# Patient Record
Sex: FEMALE | Race: WHITE
[De-identification: names, ages, dates, MRNs, and addresses within clinical notes are randomized per-mention and may not be internally consistent; named-entity substitution may affect disease eponyms.]

---

## 2017-04-19 ENCOUNTER — HOSPITAL ENCOUNTER (EMERGENCY)
Dept: HOSPITAL 10 - E/R | Age: 22
LOS: 1 days | Discharge: HOME | End: 2017-04-20
Payer: COMMERCIAL

## 2017-04-19 ENCOUNTER — HOSPITAL ENCOUNTER (OUTPATIENT)
Dept: HOSPITAL 10 - OBT | Age: 22
Discharge: HOME | End: 2017-04-19
Attending: OBSTETRICS & GYNECOLOGY
Payer: COMMERCIAL

## 2017-04-19 VITALS — RESPIRATION RATE: 16 BRPM | DIASTOLIC BLOOD PRESSURE: 71 MMHG | HEART RATE: 83 BPM | SYSTOLIC BLOOD PRESSURE: 132 MMHG

## 2017-04-19 VITALS
WEIGHT: 234.57 LBS | BODY MASS INDEX: 37.7 KG/M2 | HEIGHT: 66 IN | HEIGHT: 66 IN | BODY MASS INDEX: 37.7 KG/M2 | WEIGHT: 234.57 LBS

## 2017-04-19 VITALS
HEIGHT: 64 IN | BODY MASS INDEX: 39.9 KG/M2 | WEIGHT: 233.69 LBS | BODY MASS INDEX: 39.9 KG/M2 | HEIGHT: 64 IN | WEIGHT: 233.69 LBS

## 2017-04-19 DIAGNOSIS — Z3A.32: ICD-10-CM

## 2017-04-19 DIAGNOSIS — J02.9: ICD-10-CM

## 2017-04-19 DIAGNOSIS — H66.011: ICD-10-CM

## 2017-04-19 DIAGNOSIS — O99.89: Primary | ICD-10-CM

## 2017-04-19 DIAGNOSIS — H92.09: ICD-10-CM

## 2017-04-19 DIAGNOSIS — Z3A.33: ICD-10-CM

## 2017-04-19 DIAGNOSIS — O26.893: Primary | ICD-10-CM

## 2017-04-19 PROCEDURE — G0463 HOSPITAL OUTPT CLINIC VISIT: HCPCS

## 2017-04-19 PROCEDURE — 59025 FETAL NON-STRESS TEST: CPT

## 2017-04-19 PROCEDURE — 99283 EMERGENCY DEPT VISIT LOW MDM: CPT

## 2017-04-19 NOTE — ERD
ER Documentation


Chief Complaint


Date/Time


DATE: 17 


TIME: 21:58


Chief Complaint


33 wks pregnant, cleared from L& D, right ear pain x 1 day





HPI


This 21-year-old female presents to emergency department today with right-sided 

otalgia that started this afternoon.  Patient denies any trauma.  Reports sore 

throat and nasal congestion 4 weeks.  Patient states pain is a pressure and 

clogged feeling, pulsating without any drainage coming from the ear.  Patient 

reports change in hearing. 


 00.  Patient is 33 weeks pregnant without complaint of pelvic pain, 

vaginal discharge, dysuria, normal baby movement.





ROS


All systems reviewed and are negative except as per history of present illness.





Medications


Home Meds


Active Scripts


Acetaminophen* (Tylenol*) 325 Mg Tablet, 2 TAB PO Q6 Y for PAIN AND OR ELEVATED 

TEMP, #20 TAB


   Prov:LARISA,CARLOS         17


Amoxicillin/Potassium Clav (Amox-Clav 875-125 mg Tablet) 875-125 mg Tab, 1 TAB 

PO BID for 10 Days, #20 TAB


   Prov:LARISA,CARLOS         17


Reported Medications


[Prenatal] 1 TAB TABLET No Conflict Check, 1 TAB PO DAILY


   13


Discontinued Scripts


Acetaminophen* (Tylophen*) 500 Mg Capsule, 1 CAP PO Q6H Y for PAIN AND OR 

ELEVATED TEMP, #20 CAP


   Prov:LARISA,CARLOS         17


Acetaminophen-Codeine* (Acetaminophen-Cod #3*) 300-30 Mg Tab, 1 TAB PO Q6 Y for 

PAIN, #10 TAB


   Prov:AMY YADAV         12/23/15





Allergies


Allergies:  


Coded Allergies:  


     No Known Allergies (Verified  Allergy, 13)


 PER WRITTEN ORDER





PMhx/Soc


History of Surgery:  No


Anesthesia Reaction:  No


Hx Neurological Disorder:  No


Hx Respiratory Disorders:  No


Hx Cardiac Disorders:  No


Hx Psychiatric Problems:  No


Hx Miscellaneous Medical Probl:  No


Hx Alcohol Use:  Yes (2x month)


Hx Substance Use:  No


Hx Tobacco Use:  No





Physical Exam


Vitals


Vitals stable, triage notes reviewed


Physical Exam


Const:     No acute distress


Head:   Atraumatic 


Eyes:    Normal Conjunctiva


ENT:    Right tympanic membrane is ruptured, no discharge noted, erythemic AI 

noted.  Left tympanic membrane translucent with normal light reflex.  Nasal 

mucosa edematous, turbinates touching.  Clear mucus noted.  Pharynx is pink, 

uvula rises and falls with pronation


Neck:               Full range of motion..~ No meningismus.


Resp:    Clear to auscultation bilaterally


Cardio:    Regular rate and rhythm, no murmurs


Abd:    's abdomen


Skin:    No petechiae or rashes


Back:    No midline or flank tenderness


Ext:    No cyanosis, or edema


Neur:    Awake and alert


Psych:    Normal Mood and Affect





Procedures/MDM


This 21-year-old 33 weeks pregnant presents to emergency department for right-

sided R otalgia, sudden onset this afternoon without trauma or discharge.  

Patient reports change in hearing, a clogged pressure pulsating feeling, nasal 

congestion and sore throat 4 days.  Differential diagnosis includes but not 

limited to otitis media, cerumen impaction, upper respiratory infection, 

sinusitis, mastoiditis.  Physical exam supports otitis media with spontaneous 

ruptured membrane no drainage noted.  Patient was started on Augmentin 875 mg 

twice daily, Tylenol for pain.  I feel patient can be monitored and managed by 

primary care physician.  Return to emergency room for worsening of pain, 

drainage, or fever.  I feel the patient is stable for discharge at this time.  

I have discussed results, examination findings, the treatment plan with the 

patient and family present prior to discharge.  Indications for emergent 

reevaluation, side effects of medication were also discussed.  All questions 

were answered.  Patient verbalizes understanding and agrees with plan of care.





Departure


Diagnosis:  


 Primary Impression:  


 Otitis media


 Otitis media type:  suppurative  Laterality:  right  Chronicity:  acute  

Recurrence:  not specified as recurrent  Spontaneous tympanic membrane rupture:

  with spontaneous rupture  Qualified Code:  H66.011 - Acute suppurative otitis 

media of right ear with spontaneous rupture of tympanic membrane, recurrence 

not specified


Condition:  Good


Patient Instructions:  Otitis Media, Abx Tx (Adult)


Referrals:  


NATHAN LEHMAN MD,REBEKA LOONEY,TRESSA ZAMBRANO,APOLONIA REYNOLDS MD, M.D., MICHAEL D MD PLEET,GABRIEL BENNETT





Additional Instructions:  


Thank you for for coming to [facility] for your care today. 

Please ask your nurse or provider if you have questions about your care today 

and do not leave until all your questions have been answered.  Please use any 

medications given as directed and follow-up with your doctor (or the doctor you 

were referred to) in the next 2-3 days. If you do not have a primary care 

doctor you may follow up at the [facility] (listed below). You may also 

use motrin and tylenol as needed for fever and/or pain unless instructed 

otherwise by your provider or nurse. Indications for more urgent follow-up have 

been discussed, but you may return to the Emergency Department at ANY time for 

any worrisome or worsening symptoms.





If you have abdominal pain, please know that no test or exam you received is 

perfect and you should follow up within 8 hours for continued pain.





If you had any imaging studies today, such as an X-Ray or CT Scan, these 

studies will be reviewed later by a radiologist. You will be called if there 

are important findings that were not identified today, so make sure the contact 

information you provided at registration is correct.





If you received any narcotic pain control medicine today, such as Vicodin, 

Morphine or Dilaudid, your coordination and judgment may be affected for a 

number of hours. Please do not drive or operate heavy machinery, and you may 

want someone to assist you at home. If you were given a prescription for 

narcotic medication, be aware that it is very addictive- use sparingly and only 

if necessary.











CARLOS PERES 2017 22:04

## 2017-04-19 NOTE — QN
Documentation


Comment


21 year-old  with IUP at 32 weeks and 6 days Sona presented with 

complaint of sore throat and right ear clogged today.  She denies any fever or 

chills or body aches.  She feels pressure in her ear.  Denies any abnormal 

discharge from her ears.  She denies any leaking of fluid, vaginal bleeding or 

decreased fetal movement.


Patient denies any complications during her prenatal care





Physical examination:


GA: A&O, NAD


Abdomen: Soft, gravid, nontender, fundal height consistent with gestational age


Extremities: No calf tenderness, no click no edema no cords palpable negative 

Homans sign


HEENT: No abnormal discharge from ears noted.  No tenderness in movement of the 

ear or tugging of the ear noted.  


NST: Category 1


No contraction on the monitor








Assessment:


IUP at 32 weeks and 6 days


No OB complaints


 testing: Reassuring


Earache and Sore throat.





Patient will be sent to ED for ENT evalatuation


at this time she is clear form OB stand point of view.





Strict  labor precaution and fetal kick count discussed with the patient





Follow-up with OB office after discharge from ED as a scheduled or sooner as 

needed any complaints discussed











ROSA TRIANA MD 2017 21:45

## 2017-06-07 ENCOUNTER — HOSPITAL ENCOUNTER (INPATIENT)
Dept: HOSPITAL 10 - OBT | Age: 22
LOS: 3 days | Discharge: HOME | End: 2017-06-10
Attending: OBSTETRICS & GYNECOLOGY | Admitting: OBSTETRICS & GYNECOLOGY
Payer: COMMERCIAL

## 2017-06-07 VITALS
BODY MASS INDEX: 38.62 KG/M2 | HEIGHT: 66 IN | BODY MASS INDEX: 38.62 KG/M2 | HEIGHT: 66 IN | WEIGHT: 240.3 LBS | WEIGHT: 240.3 LBS

## 2017-06-07 VITALS — HEART RATE: 101 BPM | DIASTOLIC BLOOD PRESSURE: 66 MMHG | SYSTOLIC BLOOD PRESSURE: 120 MMHG | RESPIRATION RATE: 18 BRPM

## 2017-06-07 DIAGNOSIS — O48.0: Primary | ICD-10-CM

## 2017-06-07 DIAGNOSIS — Z3A.40: ICD-10-CM

## 2017-06-07 PROCEDURE — 87340 HEPATITIS B SURFACE AG IA: CPT

## 2017-06-07 PROCEDURE — 85610 PROTHROMBIN TIME: CPT

## 2017-06-07 PROCEDURE — 86900 BLOOD TYPING SEROLOGIC ABO: CPT

## 2017-06-07 PROCEDURE — 90715 TDAP VACCINE 7 YRS/> IM: CPT

## 2017-06-07 PROCEDURE — 86592 SYPHILIS TEST NON-TREP QUAL: CPT

## 2017-06-07 PROCEDURE — 62319: CPT

## 2017-06-07 PROCEDURE — 85730 THROMBOPLASTIN TIME PARTIAL: CPT

## 2017-06-07 PROCEDURE — 85018 HEMOGLOBIN: CPT

## 2017-06-07 PROCEDURE — 86901 BLOOD TYPING SEROLOGIC RH(D): CPT

## 2017-06-07 PROCEDURE — 84560 ASSAY OF URINE/URIC ACID: CPT

## 2017-06-07 PROCEDURE — 81003 URINALYSIS AUTO W/O SCOPE: CPT

## 2017-06-07 PROCEDURE — 90716 VAR VACCINE LIVE SUBQ: CPT

## 2017-06-07 PROCEDURE — 85025 COMPLETE CBC W/AUTO DIFF WBC: CPT

## 2017-06-07 PROCEDURE — 80053 COMPREHEN METABOLIC PANEL: CPT

## 2017-06-07 PROCEDURE — 85014 HEMATOCRIT: CPT

## 2017-06-08 VITALS — DIASTOLIC BLOOD PRESSURE: 65 MMHG | RESPIRATION RATE: 16 BRPM | HEART RATE: 77 BPM | SYSTOLIC BLOOD PRESSURE: 115 MMHG

## 2017-06-08 VITALS — SYSTOLIC BLOOD PRESSURE: 126 MMHG | DIASTOLIC BLOOD PRESSURE: 56 MMHG | RESPIRATION RATE: 20 BRPM | HEART RATE: 79 BPM

## 2017-06-08 VITALS — RESPIRATION RATE: 44 BRPM | DIASTOLIC BLOOD PRESSURE: 68 MMHG | SYSTOLIC BLOOD PRESSURE: 127 MMHG | HEART RATE: 76 BPM

## 2017-06-08 VITALS — DIASTOLIC BLOOD PRESSURE: 65 MMHG | HEART RATE: 79 BPM | RESPIRATION RATE: 18 BRPM | SYSTOLIC BLOOD PRESSURE: 126 MMHG

## 2017-06-08 LAB
ADD SCAN DIFF: NO
ADD UMIC: NO
ALBUMIN SERPL-MCNC: 4.3 G/DL (ref 3.3–4.9)
ALBUMIN/GLOB SERPL: 1.34 {RATIO}
ALP SERPL-CCNC: 174 IU/L (ref 42–121)
ALT SERPL-CCNC: 23 IU/L (ref 13–69)
ANION GAP SERPL CALC-SCNC: 13 MMOL/L (ref 8–16)
APTT BLD: 25.3 SEC (ref 25–35)
AST SERPL-CCNC: 23 IU/L (ref 15–46)
BASOPHILS # BLD AUTO: 0 10^3/UL (ref 0–0.1)
BASOPHILS NFR BLD: 0.3 % (ref 0–2)
BILIRUB DIRECT SERPL-MCNC: 0 MG/DL (ref 0–0.2)
BILIRUB SERPL-MCNC: 0.1 MG/DL (ref 0.2–1.3)
BUN SERPL-MCNC: 12 MG/DL (ref 7–20)
CALCIUM SERPL-MCNC: 9.6 MG/DL (ref 8.4–10.2)
CHLORIDE SERPL-SCNC: 109 MMOL/L (ref 97–110)
CO2 SERPL-SCNC: 21 MMOL/L (ref 21–31)
COLOR UR: (no result)
CREAT SERPL-MCNC: 0.65 MG/DL (ref 0.44–1)
EOSINOPHIL # BLD: 0.1 10^3/UL (ref 0–0.5)
EOSINOPHIL NFR BLD: 0.5 % (ref 0–7)
ERYTHROCYTE [DISTWIDTH] IN BLOOD BY AUTOMATED COUNT: 14.2 % (ref 11.5–14.5)
GLOBULIN SER-MCNC: 3.2 G/DL (ref 1.3–3.2)
GLUCOSE SERPL-MCNC: 80 MG/DL (ref 70–220)
GLUCOSE UR STRIP-MCNC: NEGATIVE %
HCT VFR BLD CALC: 36.4 % (ref 37–47)
HCT VFR BLD CALC: 38.7 % (ref 37–47)
HGB BLD-MCNC: 11.9 G/DL (ref 12–16)
HGB BLD-MCNC: 13.1 G/DL (ref 12–16)
INR PPP: 0.93
KETONES UR STRIP.AUTO-MCNC: NEGATIVE MG/DL
LYMPHOCYTES # BLD AUTO: 3 10^3/UL (ref 0.8–2.9)
LYMPHOCYTES NFR BLD AUTO: 29.7 % (ref 15–51)
MCH RBC QN AUTO: 30.6 PG (ref 29–33)
MCHC RBC AUTO-ENTMCNC: 33.9 G/DL (ref 32–37)
MCV RBC AUTO: 90.4 FL (ref 82–101)
MONOCYTES # BLD: 0.7 10^3/UL (ref 0.3–0.9)
MONOCYTES NFR BLD: 7 % (ref 0–11)
NEUTROPHILS # BLD: 6.3 10^3/UL (ref 1.6–7.5)
NEUTROPHILS NFR BLD AUTO: 62.2 % (ref 39–77)
NITRITE UR QL STRIP.AUTO: NEGATIVE
NRBC # BLD MANUAL: 0 10^3/UL (ref 0–0)
NRBC BLD QL: 0 /100WBC (ref 0–0)
PLATELET # BLD: 183 10^3/UL (ref 140–415)
PMV BLD AUTO: 9.7 FL (ref 7.4–10.4)
POTASSIUM SERPL-SCNC: 4 MMOL/L (ref 3.5–5.1)
PROT SERPL-MCNC: 7.5 G/DL (ref 6.1–8.1)
PROTHROMBIN TIME: 12.5 SEC (ref 12.2–14.2)
PT RATIO: 1
RBC # BLD AUTO: 4.28 10^6/UL (ref 4.2–5.4)
RBC # UR AUTO: NEGATIVE /UL
SODIUM SERPL-SCNC: 139 MMOL/L (ref 135–144)
URATE SERPL-MCNC: 4.9 MG/DL (ref 3.1–7.9)
URINE BILIRUBIN (DIP): NEGATIVE
URINE TOTAL PROTEIN (DIP): NEGATIVE
UROBILINOGEN UR STRIP-ACNC: (no result) (ref 0.1–1)
WBC # BLD AUTO: 10 10^3/UL (ref 4.8–10.8)
WBC # UR STRIP: NEGATIVE /UL

## 2017-06-08 RX ADMIN — IBUPROFEN SCH MG: 600 TABLET ORAL at 18:31

## 2017-06-08 RX ADMIN — DOCUSATE SODIUM AND SENNOSIDES SCH TAB: 8.6; 5 TABLET, FILM COATED ORAL at 22:14

## 2017-06-08 RX ADMIN — PYRIDOXINE HYDROCHLORIDE SCH MLS/HR: 100 INJECTION, SOLUTION INTRAMUSCULAR; INTRAVENOUS at 23:37

## 2017-06-08 RX ADMIN — DOCUSATE SODIUM AND SENNOSIDES SCH TAB: 8.6; 5 TABLET, FILM COATED ORAL at 10:32

## 2017-06-08 RX ADMIN — IBUPROFEN SCH MG: 600 TABLET ORAL at 12:29

## 2017-06-08 RX ADMIN — PYRIDOXINE HYDROCHLORIDE SCH MLS/HR: 100 INJECTION, SOLUTION INTRAMUSCULAR; INTRAVENOUS at 11:57

## 2017-06-08 RX ADMIN — IBUPROFEN SCH MG: 600 TABLET ORAL at 06:58

## 2017-06-08 RX ADMIN — PYRIDOXINE HYDROCHLORIDE SCH MLS/HR: 100 INJECTION, SOLUTION INTRAMUSCULAR; INTRAVENOUS at 15:37

## 2017-06-08 RX ADMIN — PYRIDOXINE HYDROCHLORIDE SCH MLS/HR: 100 INJECTION, SOLUTION INTRAMUSCULAR; INTRAVENOUS at 07:37

## 2017-06-08 RX ADMIN — PYRIDOXINE HYDROCHLORIDE SCH MLS/HR: 100 INJECTION, SOLUTION INTRAMUSCULAR; INTRAVENOUS at 00:07

## 2017-06-08 RX ADMIN — IBUPROFEN SCH MG: 600 TABLET ORAL at 23:51

## 2017-06-08 RX ADMIN — PYRIDOXINE HYDROCHLORIDE SCH MLS/HR: 100 INJECTION, SOLUTION INTRAMUSCULAR; INTRAVENOUS at 19:57

## 2017-06-08 RX ADMIN — PYRIDOXINE HYDROCHLORIDE SCH MLS/HR: 100 INJECTION, SOLUTION INTRAMUSCULAR; INTRAVENOUS at 07:56

## 2017-06-08 NOTE — HP
Date/Time of Note


Date/Time of Note


late entry 


DATE: 17 


TIME: 18:15





OB - History


Hx of Present Pregnancy


Free Text/Dictation


admitted for labor augmentation


Last Menstrual Period:  Sep 1, 2016


Estimated Due Date:  2017


:  3


Para:  2


Obstetrical Complications:  None


Medical Complications:  None





Past Family/Social History


*


Past Medical, Surgical, Family and Obstetric Histories reviewed from prenatal 

chart.


Blood Type:  A+


Rubella:  immune


RPR/VDRL:  Negative


GBS Status:  Negative


HBsAG:  Negative





OB  Admission Exam


Vital Signs


Vital Signs





 Vital Signs








  Date Time  Temp Pulse Resp B/P Pulse Ox O2 Delivery O2 Flow Rate FiO2


 


17 16:35 98.3 76 44 127/68  Room Air  











Physical Exam


HEENT:  WNL


Heart:  Rhythm Normal


Lungs:  Clear, Equal


Abdomen:  WNL


Extremities:  Normal


Reflexes:  Normal


Cervical Dilatation:  2cm


Effacement:  75%


Station:  -3


Membranes:  Intact


Accelerations:  Accelerations Present


Decelerations:  No Decelerations


Varibility:  Marked


Contractions on Admission:  >10 Minutes Apart


Last 72 hours Lab Results


 CBC & BMP


17 23:40








17 02:20








17 11:05











 Liver Function








Test


  17


02:20


 


Alanine Aminotransferase


(ALT/SGPT) 23  


 


 


Albumin 4.3  


 


Alkaline Phosphatase 174  H


 


Aspartate Amino Transf


(AST/SGOT) 23  


 


 


Direct Bilirubin 0.00  


 


Total Protein 7.5  











OB  Assessment/Plan


Other Assessment:


elective labor augmentation


Induction Method:  per Pitocin Protocol











JAGRUTI MATIAS MD 2017 18:17

## 2017-06-08 NOTE — PD.PPDC
OB/GYN Discharge Instruction


Provider Information


Physician Information


20 y/o female had vaginal delivery





Condition


Patient Condition:  Good





Diet


Diet:  Resume Regular Diet





Activity/Restrictions








Activity:   Normal Activity





 May Shower














Restrictions:   Nothing in the Vagina





Return to Work or School:  Jul 24, 2017





Follow-up


Follow-up with Physician:  4, Week/Weeks (in clinic)





Return to clinic for








OB Instructions:   Breast Tenderness





 Depression

















JAGRUTI MATIAS MD Jun 8, 2017 18:20

## 2017-06-08 NOTE — LDN
Date/Time of Note


Date/Time of Note


DATE: 17 


TIME: 03:48





Delivery Summary


I was called due to precipitous delivery for this patient due to unavailability 

of the primary OB,


Attended to the delivery room.


Placenta Delivered:  Spontaneously


Meconium:  Light


Episiotomy:  No


Perineal laceration:  0


Laceration repair:  


N/A


Anesthesia type:  Epidural


Sponge & Needle done & correct:  Yes


Any foreign bodies felt in the:  No


Problems:  





Infant Delivery Information


Sex


Infant Sex:  female





Apgars


1 Minute:  5


5 Minute:  9





Suctioning


Nose & mouth suctioned at [place]:  No


Delee suction performed:  Yes





Umbilical Cord


Umbilical cord with:  3 Vessels


Cord presentations:  no nuchal cord


Cord Blood was obtained:  Yes





Mother & Baby Disposition


Disposition


when attended to the delivery patient was anterior lip and was feeling urge to 

push.


Shortly after arrival to the bed side patient was complete. urge to push. AROM 

done, light meconium noted,. 


Baby had short decelerations during second stage of labor


After delivery of the head, moderate difficulty noted at delivery of the 

shoulder that resolved with suprapubic pressure.


left shoulder was anterior. after delivery of the head, with suprapubic pressure

, first posterior shoulder, then anterior shoulder with gentle traction 

delivered.


Total time of head at the perineum about 45 seconds after delivery of the 

shoulders the rest of the body Delivered without any complication.


No fundal pressure applied.   evaluated after birth.  Normal muscle 

tone in both sides and arms as well as left arm that was behind the pubis noted.


Baby was transferred to warmer.  Apgar 5, 9


Placenta delivered complete spontaneously and evaluated and was noted to be 

complete.  Three-vessel cord noted.  Fundus was firm at the end of the delivery


Mom & Baby to Maternity; Good:  Yes











ROSA TRIANA MD 2017 03:57

## 2017-06-09 VITALS — DIASTOLIC BLOOD PRESSURE: 52 MMHG | SYSTOLIC BLOOD PRESSURE: 94 MMHG | HEART RATE: 82 BPM | RESPIRATION RATE: 20 BRPM

## 2017-06-09 VITALS — RESPIRATION RATE: 18 BRPM | DIASTOLIC BLOOD PRESSURE: 66 MMHG | HEART RATE: 68 BPM | SYSTOLIC BLOOD PRESSURE: 106 MMHG

## 2017-06-09 VITALS — SYSTOLIC BLOOD PRESSURE: 120 MMHG | DIASTOLIC BLOOD PRESSURE: 75 MMHG | RESPIRATION RATE: 18 BRPM | HEART RATE: 65 BPM

## 2017-06-09 RX ADMIN — IBUPROFEN SCH MG: 600 TABLET ORAL at 12:00

## 2017-06-09 RX ADMIN — IBUPROFEN SCH MG: 600 TABLET ORAL at 06:06

## 2017-06-09 RX ADMIN — DOCUSATE SODIUM AND SENNOSIDES SCH TAB: 8.6; 5 TABLET, FILM COATED ORAL at 20:57

## 2017-06-09 RX ADMIN — DOCUSATE SODIUM AND SENNOSIDES SCH TAB: 8.6; 5 TABLET, FILM COATED ORAL at 09:00

## 2017-06-09 RX ADMIN — IBUPROFEN SCH MG: 600 TABLET ORAL at 17:50

## 2017-06-09 NOTE — DS
Date/Time of Note


Date/Time of Note


home next day


DATE: 6/9/17 


TIME: 01:34





Obstetrical Discharge Record


Final Diagnosis


Final Diagnosis:  Term delivered


Other Final Diagnosis


SP vaginal delivery





Vaginal Delivery


Obstetrical Delivery:  Spontaneous





Complications


Augmentation:  Yes





Condition on Discharge


Physical Assessment


Last Vitals:


see nurses notes


Voiding:  Yes


Bowel Movement:  Yes


Breast:  Soft, non-tender, Filling


Fundus:  Firm


Abdomen and Incision:


soft BS +


Episiotomy:


NA


Calf Tenderness:  No


Patient Condition:  Good











JAGRUTI MATIAS MD Jun 9, 2017 01:35

## 2017-06-09 NOTE — NSTRPT
===================================

NST Information

===================================

Datetime Report Generated by CPN: 06/09/2017 10:22

   

   

===========================

Datetime: 06/07/2017 09:46

===========================

   

   

===================================

NST Information

===================================

   

 EGA:  39.6

 Test Number:  1

 Time on Monitor:  06/07/2017 10:21

 Time off Monitor:  06/07/2017 10:54

 NST Duration (Min):  33

 Reason for NST:  Poor Fetal Growth

 Test and Monitor Explained:  Monitor Explained; Test Explained; Verbalized Understanding

   (Annotations: Data stored by [facility] on behalf of user)

 Pulse:  88

 Resp:  18

 SBP:  111

 DBP:  66

   

===================================

Test Evaluation

===================================

   

 NST Interventions:  PO Hydration

 Patient States Fetal Movement:  Present

 Contraction Frequency:  OCCASIONAL

 FHR Baseline :  130

 Variability:  Moderate 6-25bpm

 Accelerations:  15X15

 Decelerations:  None

 FHR Category:  Category I

 NST Results:  Reactive





 Comments:  pt to U/S, [id number] GMS, CEPH, LAM-13.0 CM. 1055: Pt D/C home with term labor precaution
s, no F/U given-pt states she is going to see Dr Bates now, preliminary report given to patient, di
scussed fetal kick counts, pt verbalizes understanding and deneis questions at this time.

   

===================================

Electronically Signed By

===================================

   

 E-Signature:  Electronically signed by Kavitha Navarrete MD on 6/9/2017 at 10:22  with User ID: [id number]

## 2017-06-09 NOTE — PN
Date/Time of Note


Date/Time of Note


DATE: 17 


TIME: 16:11





OB Subjective


Subjective


Subjective


no c/o





OB Objective


Objective


Objective


vss   afebrile


fundus firm


lochia min 


calf  neg





OB  Assessment/Plan


Other Assessment:


stable post  #!


Other plan:


d/s home in am











IMELDA ALFRED MD 2017 16:19

## 2017-06-10 VITALS — RESPIRATION RATE: 18 BRPM | HEART RATE: 61 BPM | DIASTOLIC BLOOD PRESSURE: 74 MMHG | SYSTOLIC BLOOD PRESSURE: 128 MMHG

## 2017-06-10 VITALS — HEART RATE: 66 BPM | DIASTOLIC BLOOD PRESSURE: 78 MMHG | RESPIRATION RATE: 17 BRPM | SYSTOLIC BLOOD PRESSURE: 133 MMHG

## 2017-06-10 RX ADMIN — IBUPROFEN SCH MG: 600 TABLET ORAL at 06:15

## 2017-06-10 RX ADMIN — IBUPROFEN SCH MG: 600 TABLET ORAL at 11:31

## 2017-06-10 RX ADMIN — DOCUSATE SODIUM AND SENNOSIDES SCH TAB: 8.6; 5 TABLET, FILM COATED ORAL at 09:04

## 2017-06-10 RX ADMIN — IBUPROFEN SCH MG: 600 TABLET ORAL at 00:30

## 2017-11-15 ENCOUNTER — HOSPITAL ENCOUNTER (EMERGENCY)
Dept: HOSPITAL 10 - FTE | Age: 22
Discharge: HOME | End: 2017-11-15
Payer: COMMERCIAL

## 2017-11-15 VITALS
BODY MASS INDEX: 37.24 KG/M2 | HEIGHT: 66 IN | WEIGHT: 231.71 LBS | BODY MASS INDEX: 37.24 KG/M2 | HEIGHT: 66 IN | WEIGHT: 231.71 LBS

## 2017-11-15 DIAGNOSIS — J02.9: Primary | ICD-10-CM

## 2017-11-15 PROCEDURE — 99283 EMERGENCY DEPT VISIT LOW MDM: CPT

## 2017-11-15 NOTE — ERD
ER Documentation


Chief Complaint


Chief Complaint


SORE THROAT, EARACHE, FEVER





HPI


This is  a 22-year-old female who presents the emergency department today 

complaining of right earache and sore throat for the past 3 days.  States that 

last night she think she had a fever.  States she took ibuprofen for pain.  

Denies any cough, runny nose or nasal congestion





ROS


All systems reviewed and are negative except as per history of present illness.





Medications


Home Meds


Active Scripts


Acetaminophen* (Tylophen*) 500 Mg Capsule, 1 CAP PO Q6H Y for PAIN AND OR 

ELEVATED TEMP, #30 CAP


   Prov:CAROLYN PEÑALOZA PA-C         11/15/17


Ibuprofen* (Motrin*) 600 Mg Tab, 600 MG PO Q6, #30 TAB


   Prov:CAROLYN PEÑALOZA PA-C         11/15/17


Amoxicillin* (Amoxicillin*) 500 Mg Cap, 500 MG PO TID for 10 Days, CAP


   Prov:CAROLYN PEÑALOZA PA-C         11/15/17


Ibuprofen* (Ibuprofen*) 600 Mg Tablet, 600 MG PO Q6, #30 TAB 0 Refills


   Prov:JAGRUTI MATIAS MD         6/8/17


Acetaminophen* (Tylenol*) 325 Mg Tablet, 2 TAB PO Q6 Y for PAIN AND OR ELEVATED 

TEMP, #20 TAB


   Prov:CARLOS PERES         4/19/17


Reported Medications


[Prenatal] 1 TAB TABLET No Conflict Check, 1 TAB PO DAILY


   9/6/13





Allergies


Allergies:  


Coded Allergies:  


     No Known Allergies (Verified  Allergy, Unknown, 5/5/17)


 PER WRITTEN ORDER





PMhx/Soc


Medical and Surgical Hx:  pt denies Medical Hx, pt denies Surgical Hx


History of Surgery:  No


Anesthesia Reaction:  No


Hx Neurological Disorder:  No


Hx Respiratory Disorders:  No


Hx Cardiac Disorders:  No


Hx Psychiatric Problems:  No


Hx Miscellaneous Medical Probl:  No


Hx Alcohol Use:  Yes (2x month)


Hx Substance Use:  No


Hx Tobacco Use:  No


Smoking Status:  Never smoker





Physical Exam


Vitals





Vital Signs








  Date Time  Temp Pulse Resp B/P Pulse Ox O2 Delivery O2 Flow Rate FiO2


 


11/15/17 10:13 98.5 86 16 129/75 98   








Physical Exam


Const:     NAD


Head:   Atraumatic 


Eyes:    Normal Conjunctiva


ENT:    right Ear with TM erythema.  Left ear TM normal.  Nose no drainage.  

Throat with erythema and bilateral tonsillar exudate.


Neck:               Full range of motion..~ No meningismus.


Resp:    Clear to auscultation bilaterally


Cardio:    Regular rate and rhythm, no murmurs


Abd:    Soft, non tender, non distended. Normal bowel sounds


Skin:    No petechiae or rashes





Neur:    Awake and alert


Psych:    Normal Mood and Affect





Procedures/MDM


This is a 22-year-old female who presents the emergency department today 

complaining of right ear ache and sore throat for the past 3 days.  On physical 

exam patient has right TM erythema and she also has bilateral tonsillar 

exudates.  She is afebrile at this time however given her tonsillar exudate, 

absence of cough and tender cervical lymphadenopathy I will treat the patient 

with amoxicillin for likely strep pharyngitis.    I have low suspicion for  

peritonsillar abscess, retropharyngeal abscess, otitis externa, mastoiditis, PNA

, sinusitis, abscess, meningitis, sepsis, or other acute infectious bacterial 

process.  





Patient declined pain medication here in the emergency department.  She was 

given a prescription for Tylenol, Motrin and amoxicillin.





At this time the patient is stable for discharge and outpatient management.  

They should follow up with their PCP in the next 1-2.  They may return to the 

emergency department sooner if symptoms persist or worsen.  Patient understood 

and agreed with the plan.





Departure


Diagnosis:  


 Primary Impression:  


 Sore throat


Condition:  Fair


Patient Instructions:  Self-Care for Sore Throats, Pharyngitis, Strep (Presumed)





Additional Instructions:  


Call your primary care doctor TOMORROW for an appointment during the next 1-2 

days.See the doctor sooner or return here if your condition worsens before your 

appointment time.





Take Anti-biotics as prescribed.  Take Tylenol or Motrin for pain.  Drink 

plenty of fluids and stay well hydrated.











CAROLYN PEÑALOZA PA-C Nov 15, 2017 12:52

## 2018-08-28 ENCOUNTER — HOSPITAL ENCOUNTER (EMERGENCY)
Dept: HOSPITAL 91 - FTE | Age: 23
LOS: 1 days | Discharge: LEFT BEFORE DISCHARGE | End: 2018-08-29
Payer: SELF-PAY

## 2018-08-28 ENCOUNTER — HOSPITAL ENCOUNTER (EMERGENCY)
Age: 23
LOS: 1 days | Discharge: LEFT BEFORE DISCHARGE | End: 2018-08-29

## 2018-08-28 DIAGNOSIS — Z53.21: Primary | ICD-10-CM

## 2019-04-16 ENCOUNTER — HOSPITAL ENCOUNTER (EMERGENCY)
Dept: HOSPITAL 10 - FTE | Age: 24
Discharge: HOME | End: 2019-04-16
Payer: COMMERCIAL

## 2019-04-16 ENCOUNTER — HOSPITAL ENCOUNTER (EMERGENCY)
Dept: HOSPITAL 91 - FTE | Age: 24
Discharge: HOME | End: 2019-04-16
Payer: COMMERCIAL

## 2019-04-16 VITALS — SYSTOLIC BLOOD PRESSURE: 126 MMHG | HEART RATE: 70 BPM | RESPIRATION RATE: 18 BRPM | DIASTOLIC BLOOD PRESSURE: 78 MMHG

## 2019-04-16 VITALS
WEIGHT: 180.56 LBS | HEIGHT: 65 IN | HEIGHT: 65 IN | BODY MASS INDEX: 30.08 KG/M2 | BODY MASS INDEX: 30.08 KG/M2 | WEIGHT: 180.56 LBS

## 2019-04-16 DIAGNOSIS — M25.512: Primary | ICD-10-CM

## 2019-04-16 PROCEDURE — 99283 EMERGENCY DEPT VISIT LOW MDM: CPT

## 2019-04-16 PROCEDURE — 72040 X-RAY EXAM NECK SPINE 2-3 VW: CPT

## 2019-04-16 NOTE — ERD
ER Documentation


Chief Complaint


Chief Complaint





LEFT SHOULDER PAIN X'S 6 DAYS





HPI


23-year-old female presenting with left arm pain.  3 days ago patient fell and 


sustained an AC separation.  She has some paresthesias and pain down her left 


arm.  Has been taking ibuprofen with only mild alleviation.  Patient has pain 


stemming from the neck.  Denies other medical problems.  NKDA.  Surgical history


denies.  Social history denies





ROS


All systems reviewed and are negative except as per history of present illness.





Medications


Home Meds


Active Scripts


Cyclobenzaprine Hcl* (Cyclobenzaprine Hcl*) 10 Mg Tablet, 10 MG PO TID, #15 TAB


   Prov:PATY FIGUEROA PA-C         19


Naproxen* (Naprosyn*) 500 Mg Tablet, 500 MG PO BID PRN for PAIN AND/OR 


INFLAMMATION, #30 TAB


   Prov:PATY FIGUEROA PA-C         19


Hydrocodone/Acetaminophen (Norco 5-325 Tablet) 1 Each Tablet, 1 TAB PO Q6H PRN 


for PAIN, #7 TAB


   Prov:PATY FIGUEROA PA-C         19


Methylprednisolone* (Medrol* DOSE PACK) 4 Mg/Dose-Pack Tab.ds.pk, 4 MG PO .AS 


DIRECTED, #1 PACKET


   Prov:PATY FIGUEROA PA-C         19


Acetaminophen* (Tylophen*) 500 Mg Capsule, 1 CAP PO Q6H PRN for PAIN AND OR 


ELEVATED TEMP, #30 CAP


   Prov:CAROLYN PEÑALOZA PA-C         11/15/17


Ibuprofen* (Motrin*) 600 Mg Tab, 600 MG PO Q6, #30 TAB


   Prov:CAROLYN PEÑALOZA PA-C         11/15/17


Amoxicillin* (Amoxicillin*) 500 Mg Cap, 500 MG PO TID for 10 Days, CAP


   Prov:CAROLYN PEÑALOZA PA-C         11/15/17


Ibuprofen* (Ibuprofen*) 600 Mg Tablet, 600 MG PO Q6, #30 TAB 0 Refills


   Prov:JAGRUTI MATIAS MD         17


Acetaminophen* (Tylenol*) 325 Mg Tablet, 2 TAB PO Q6 PRN for PAIN AND OR 


ELEVATED TEMP, #20 TAB


   Prov:LARISA,MELODY         17


Reported Medications


[Prenatal] 1 TAB TABLET No Conflict Check, 1 TAB PO DAILY


   13





Allergies


Allergies:  


Coded Allergies:  


     No Known Allergies (Verified  Allergy, Unknown, 17)


   


   PER WRITTEN ORDER





PMhx/Soc


Medical and Surgical Hx:  pt denies Medical Hx, pt denies Surgical Hx


History of Surgery:  No


Anesthesia Reaction:  No


Hx Neurological Disorder:  No


Hx Respiratory Disorders:  No


Hx Cardiac Disorders:  No


Hx Psychiatric Problems:  No


Hx Miscellaneous Medical Probl:  No


Hx Alcohol Use:  No


Hx Substance Use:  No


Hx Tobacco Use:  No


Smoking Status:  Never smoker





FmHx


Family History:  No diabetes, No coronary disease, No other





Physical Exam


Vitals





Vital Signs


  Date      Temp  Pulse  Resp  B/P (MAP)   Pulse Ox  O2          O2 Flow    FiO2


Time                                                 Delivery    Rate


   19  98.3     70    18      126/78       100


     19:20                           (94)





Physical Exam


GENERAL: The patient is well-appearing, well-nourished, in no acute distress


HEENT: Atraumatic.  Conjunctivae are pink.  Pupils equal, round, and reactive to


 light.  There is no scleral icterus.  Tympanic membranes clear bilaterally.  


Oropharynx clear.  


NECK: C-spine is soft and supple.  There is no meningismus.  There is no 


cervical lymphadenopathy.  Palpation along cervical spine with no bony step-


offs.


CHEST: Clear to auscultation bilaterally.  There are no rales, wheezes or 


rhonchi.


HEART: Regular rate and rhythm.  No murmurs, clicks, rubs or gallops.  


EXTREMITIES: Equal pulses bilaterally.  There is no peripheral clubbing, 


cyanosis or edema.  No focal swelling or erythema.  Full range of motion.  


Grossly neurovascularly intact.


NEUROLOGIC: Alert and oriented.  Cranial nerves II through XII intact.  Motor 


strength in all 4 extremities with 5 out of 5 strength.  Sensation grossly 


intact.  Normal speech and gait.





Procedures/MDM


                            DIAGNOSTIC IMAGING REPORT





Patient: SARAN OLIVEIRA   : 1995   Age: 23  Sex: F                        


       MR #:    [id number]   Acct #:   [id number]    DOS: 19


Ordering MD: JACK FIGUEROA PA-C   Location:  [facility]   Room/Bed:            


                                


                                        


PROCEDURE:   XR Cervical Spine. 


 


CLINICAL INDICATION:  Neck pain after fall


 


TECHNIQUE:   AP, lateral and odontoid views of the cervical spine were 


performed. The images were reviewed on a PACS workstation. 


 


COMPARISON:   None. 


 


FINDINGS:


The vertebral body alignment, height and osseous mineralization are normal. 


The intervertebral disc spaces are well maintained. 


There are no abnormal calcifications. 


The prevertebral soft tissues are normal. 


No radiopaque foreign bodies are identified. 


There is no acute fracture or subluxation.


 


IMPRESSION:


Minimal reversal of the normal cervical lordosis in the mid cervical spine which


 could be secondary positioning or muscle spasm. Minimal curvature of the 


cervical spine with convexity to the left which could be secondary to muscle 


spasm. No acute fracture or dislocation is seen.


 





MDM: 83-year-old female presenting with neck pain.  I have low suspicion for 


acute fracture dislocation.  Patient's pain is likely associated with muscular 


skeletal spasm and nerve impingement.  Patient will be discharged with 


supportive medications.  Patient is told if symptoms change or worsen to return 


immediately to the ER.  All questions answered at discharge





Departure


Diagnosis:  


   Primary Impression:  


   Shoulder pain


Condition:  Stable


Patient Instructions:  Paraesthesias, Shoulder Pain (Uncertain Cause)


Referrals:  


[facility]


YOU HAVE RECEIVED A MEDICAL SCREENING EXAM AND THE RESULTS INDICATE THAT YOU DO 


NOT HAVE A CONDITION THAT REQUIRES URGENT TREATMENT IN THE EMERGENCY DEPARTMENT.





FURTHER EVALUATION AND TREATMENT OF YOUR CONDITION CAN WAIT UNTIL YOU ARE SEEN 


IN YOUR DOCTORS OFFICE WITHIN THE NEXT 1-2 DAYS. IT IS YOUR RESPONSIBILITY TO 


MAKE AN APPOINTMENT FOR FOLOW-UP CARE.





IF YOU HAVE A PRIMARY DOCTOR


--you should call your primary doctor and schedule an appointment





IF YOU DO NOT HAVE A PRIMARY DOCTOR YOU CAN CALL OUR PHYSICIAN REFERRAL HOTLINE 


AT


 [phone] 





IF YOU CAN NOT AFFORD TO SEE A PHYSICIAN YOU CAN CHOSE FROM THE FOLLOWING 


[facility]





[facility] [phone] [address]. [facility] [phone] [address] [facility]. [facility] [phone] [address] [facility]. [facility] [phone] [address] [facility]. [facility] [phone] [address] [facility]. [facility]. [phone] [address] [address]





Additional Instructions:  


FOLLOW UP WITH YOUR PRIMARY CARE PHYSICIAN TOMORROW.Return to this facility if 


you are not improving as expected.











PATY FIGUEROA PA-C       2019 20:50

## 2023-07-24 NOTE — TRIAGE
===================================

OB Triage

===================================

Datetime Report Generated by CPN: 04/19/2017 21:07

   

   

===========================

Datetime: 04/19/2017 20:58

===========================

   

   

===================================

Labor Evaluation

===================================

   

 Frequency:  NONE SEEN

 Monitor Mode:  External

 Resting Tone Wiley:  Relaxed

   

===================================

Fetal Heart Rate

===================================

   

 FHR Baseline Rate:  145

 Monitor Mode:  External US

 FHR Baseline Changes:  No Baseline Change

 Variability:  Moderate 6-25 bpm

 Accelerations:  15X15

 Decelerations:  None

 Category:  Category I

   

===========================

Datetime: 04/19/2017 20:44

===========================

   

 EGA:  32.6

   

===========================

Datetime: 04/19/2017 20:38

===========================

   

 Time of Arrival:  04/19/2017 20:00

 Arrived By:  Ambulatory

 Arrived From:  Home

 Chief Complaint:  SORE THROAT AND EARACHE

 Fetal Movement:  Present

 Contractions:  Denies/Absent

 Contractions:  NONE

 Rupture of Membranes:  Denies

 Vaginal Bleeding:  None

 Vaginal Discharge:  Denies

 Recent Sexual Intercouse:  Denies

 Abdominal Trauma:  Not Applicable

 Patient Complaints:  Other

 Time Provider Notified:  04/19/2017 20:40

 Provider Notified:  KAMILAH

 Initial Plan:  NST Patient's HM shows they are overdue for Colonoscopy. [employer] and  files searched with success.   Results attached to order and HM updated